# Patient Record
(demographics unavailable — no encounter records)

---

## 2025-07-22 NOTE — PLAN
[FreeTextEntry1] : -HPV/Pap performed today -Referral given for mammo/sono -Advise colonoscopy  -RTO 1 year for annual

## 2025-07-22 NOTE — HISTORY OF PRESENT ILLNESS
[FreeTextEntry1] :  45 year old P3 LMP 7/19 presents for annual gyn visit. Pt feels well and has no complaints. She has normal menses. She denies intermenstrual bleeding, itching, burning, or abnormal discharge. Pt has IUD in place and happy with it.   [Y] : Positive pregnancy history [Mammogramdate] : 05/29/2024 [Papeardate] : 03/09/2022 [PGHxTotal] : 3 [[facility] : [facility] [[facility]] : 3 [Currently Active] : currently active [Men] : men [No] : No [FreeTextEntry3] : IUD

## 2025-07-22 NOTE — END OF VISIT
[FreeTextEntry3] :  This note was written by Magy Santamaria on 07/22/2025 actively solely Maren Hitchcock M.D. All medical record entries made by this scribe at my, Maren Hitchcock M.D direction and personally dictated by me on 07/22/2025. I have personally reviewed the chart and agree that the record reflects my personal performance of the history, physical exam, assessment, and plan.

## 2025-07-22 NOTE — END OF VISIT
[FreeTextEntry3] :  This note was written by Magy Santamaria on 07/22/2025 actively solely Maren Hitchcock M.D. All medical record entries made by this scribe at my, Maren Hitchcock M.D direction and personally dictated by me on 07/22/2025. I have personally reviewed the chart and agree that the record reflects my personal performance of the history, physical exam, assessment, and plan.   20:53

## 2025-07-22 NOTE — HISTORY OF PRESENT ILLNESS
[FreeTextEntry1] :  45 year old P3 LMP 7/19 presents for annual gyn visit. Pt feels well and has no complaints. She has normal menses. She denies intermenstrual bleeding, itching, burning, or abnormal discharge. Pt has IUD in place and happy with it.   [Y] : Positive pregnancy history [Mammogramdate] : 05/29/2024 [Papeardate] : 03/09/2022 [PGHxTotal] : 3 [[facility]] : 3 [[facility]] : 3 [Currently Active] : currently active [Men] : men [No] : No [FreeTextEntry3] : IUD